# Patient Record
Sex: MALE | Race: WHITE | URBAN - METROPOLITAN AREA
[De-identification: names, ages, dates, MRNs, and addresses within clinical notes are randomized per-mention and may not be internally consistent; named-entity substitution may affect disease eponyms.]

---

## 2021-04-22 ENCOUNTER — HOSPITAL ENCOUNTER (OUTPATIENT)
Dept: NUTRITION | Facility: HOSPITAL | Age: 66
End: 2021-04-23
Attending: INTERNAL MEDICINE | Admitting: INTERNAL MEDICINE

## 2021-04-22 LAB
ABS NEUT (OLG): 5.67 X10(3)/MCL (ref 2.1–9.2)
ALBUMIN SERPL-MCNC: 3.4 GM/DL (ref 3.4–4.8)
ALBUMIN/GLOB SERPL: 0.9 RATIO (ref 1.1–2)
ALP SERPL-CCNC: 102 UNIT/L (ref 40–150)
ALT SERPL-CCNC: 12 UNIT/L (ref 0–55)
AST SERPL-CCNC: 15 UNIT/L (ref 5–34)
BASOPHILS # BLD AUTO: 0 X10(3)/MCL (ref 0–0.2)
BASOPHILS NFR BLD AUTO: 0 %
BILIRUB SERPL-MCNC: 0.5 MG/DL
BILIRUBIN DIRECT+TOT PNL SERPL-MCNC: 0.2 MG/DL (ref 0–0.5)
BILIRUBIN DIRECT+TOT PNL SERPL-MCNC: 0.3 MG/DL (ref 0–0.8)
BUN SERPL-MCNC: 12.3 MG/DL (ref 8.4–25.7)
CALCIUM SERPL-MCNC: 9.3 MG/DL (ref 8.8–10)
CHLORIDE SERPL-SCNC: 108 MMOL/L (ref 98–107)
CK MB SERPL-MCNC: 0.5 NG/ML
CK SERPL-CCNC: 40 U/L (ref 30–200)
CO2 SERPL-SCNC: 22 MMOL/L (ref 23–31)
CREAT SERPL-MCNC: 0.86 MG/DL (ref 0.73–1.18)
EOSINOPHIL # BLD AUTO: 0.3 X10(3)/MCL (ref 0–0.9)
EOSINOPHIL NFR BLD AUTO: 4 %
ERYTHROCYTE [DISTWIDTH] IN BLOOD BY AUTOMATED COUNT: 13 % (ref 11.5–17)
GLOBULIN SER-MCNC: 3.8 GM/DL (ref 2.4–3.5)
GLUCOSE SERPL-MCNC: 120 MG/DL (ref 82–115)
HCT VFR BLD AUTO: 35.8 % (ref 42–52)
HGB BLD-MCNC: 11.6 GM/DL (ref 14–18)
LYMPHOCYTES # BLD AUTO: 0.9 X10(3)/MCL (ref 0.6–4.6)
LYMPHOCYTES NFR BLD AUTO: 12 %
MAGNESIUM SERPL-MCNC: 1.9 MG/DL (ref 1.6–2.6)
MAGNESIUM SERPL-MCNC: 2 MG/DL (ref 1.6–2.6)
MCH RBC QN AUTO: 29.6 PG (ref 27–31)
MCHC RBC AUTO-ENTMCNC: 32.4 GM/DL (ref 33–36)
MCV RBC AUTO: 91.3 FL (ref 80–94)
MONOCYTES # BLD AUTO: 0.4 X10(3)/MCL (ref 0.1–1.3)
MONOCYTES NFR BLD AUTO: 6 %
NEUTROPHILS # BLD AUTO: 5.67 X10(3)/MCL (ref 2.1–9.2)
NEUTROPHILS NFR BLD AUTO: 78 %
PLATELET # BLD AUTO: 273 X10(3)/MCL (ref 130–400)
PMV BLD AUTO: 9.8 FL (ref 9.4–12.4)
POTASSIUM SERPL-SCNC: 3.9 MMOL/L (ref 3.5–5.1)
PROT SERPL-MCNC: 7.2 GM/DL (ref 5.8–7.6)
RBC # BLD AUTO: 3.92 X10(6)/MCL (ref 4.7–6.1)
SARS-COV-2 RNA RESP QL NAA+PROBE: NOT DETECTED
SODIUM SERPL-SCNC: 140 MMOL/L (ref 136–145)
TROP 3HR (OHS): <0.01 NG/ML (ref 0–0.04)
TROP IP BASE (OHS): <0.01 NG/ML (ref 0–0.04)
TROPONIN I SERPL-MCNC: <0.01 NG/ML (ref 0–0.04)
TROPONIN I SERPL-MCNC: <0.01 NG/ML (ref 0–0.04)
TSH SERPL-ACNC: 0.39 UIU/ML (ref 0.35–4.94)
WBC # SPEC AUTO: 7.3 X10(3)/MCL (ref 4.5–11.5)

## 2021-04-23 LAB
ABS NEUT (OLG): 5.26 X10(3)/MCL (ref 2.1–9.2)
ALBUMIN SERPL-MCNC: 3.2 GM/DL (ref 3.4–4.8)
ALBUMIN/GLOB SERPL: 1.2 RATIO (ref 1.1–2)
ALP SERPL-CCNC: 92 UNIT/L (ref 40–150)
ALT SERPL-CCNC: 11 UNIT/L (ref 0–55)
AST SERPL-CCNC: 9 UNIT/L (ref 5–34)
BASOPHILS # BLD AUTO: 0 X10(3)/MCL (ref 0–0.2)
BASOPHILS NFR BLD AUTO: 0 %
BILIRUB SERPL-MCNC: 0.4 MG/DL
BILIRUBIN DIRECT+TOT PNL SERPL-MCNC: 0.2 MG/DL (ref 0–0.5)
BILIRUBIN DIRECT+TOT PNL SERPL-MCNC: 0.2 MG/DL (ref 0–0.8)
BUN SERPL-MCNC: 14.6 MG/DL (ref 8.4–25.7)
CALCIUM SERPL-MCNC: 8.4 MG/DL (ref 8.8–10)
CHLORIDE SERPL-SCNC: 107 MMOL/L (ref 98–107)
CO2 SERPL-SCNC: 25 MMOL/L (ref 23–31)
CREAT SERPL-MCNC: 0.8 MG/DL (ref 0.73–1.18)
EOSINOPHIL # BLD AUTO: 0.5 X10(3)/MCL (ref 0–0.9)
EOSINOPHIL NFR BLD AUTO: 6 %
ERYTHROCYTE [DISTWIDTH] IN BLOOD BY AUTOMATED COUNT: 13.2 % (ref 11.5–17)
GLOBULIN SER-MCNC: 2.7 GM/DL (ref 2.4–3.5)
GLUCOSE SERPL-MCNC: 107 MG/DL (ref 82–115)
HCT VFR BLD AUTO: 34.1 % (ref 42–52)
HGB BLD-MCNC: 10.9 GM/DL (ref 14–18)
LYMPHOCYTES # BLD AUTO: 1.6 X10(3)/MCL (ref 0.6–4.6)
LYMPHOCYTES NFR BLD AUTO: 20 %
MCH RBC QN AUTO: 30.1 PG (ref 27–31)
MCHC RBC AUTO-ENTMCNC: 32 GM/DL (ref 33–36)
MCV RBC AUTO: 94.2 FL (ref 80–94)
MONOCYTES # BLD AUTO: 0.7 X10(3)/MCL (ref 0.1–1.3)
MONOCYTES NFR BLD AUTO: 9 %
NEUTROPHILS # BLD AUTO: 5.26 X10(3)/MCL (ref 2.1–9.2)
NEUTROPHILS NFR BLD AUTO: 65 %
PLATELET # BLD AUTO: 257 X10(3)/MCL (ref 130–400)
PMV BLD AUTO: 9.4 FL (ref 9.4–12.4)
POTASSIUM SERPL-SCNC: 4.3 MMOL/L (ref 3.5–5.1)
PROT SERPL-MCNC: 5.9 GM/DL (ref 5.8–7.6)
RBC # BLD AUTO: 3.62 X10(6)/MCL (ref 4.7–6.1)
SODIUM SERPL-SCNC: 139 MMOL/L (ref 136–145)
TROP 6HR (OHS): <0.01 NG/ML (ref 0–0.45)
TROP IP BASE (OHS): <0.01 NG/ML (ref 0–0.04)
WBC # SPEC AUTO: 8.1 X10(3)/MCL (ref 4.5–11.5)

## 2021-04-26 ENCOUNTER — HOSPITAL ENCOUNTER (OUTPATIENT)
Dept: MEDSURG UNIT | Facility: HOSPITAL | Age: 66
End: 2021-04-29
Attending: INTERNAL MEDICINE | Admitting: INTERNAL MEDICINE

## 2021-04-26 LAB
ABS NEUT (OLG): 8.46 X10(3)/MCL (ref 2.1–9.2)
ALBUMIN SERPL-MCNC: 4 GM/DL (ref 3.4–4.8)
ALBUMIN/GLOB SERPL: 1.1 RATIO (ref 1.1–2)
ALP SERPL-CCNC: 122 UNIT/L (ref 40–150)
ALT SERPL-CCNC: 13 UNIT/L (ref 0–55)
APTT PPP: 35.5 SECOND(S) (ref 23.2–33.7)
AST SERPL-CCNC: 11 UNIT/L (ref 5–34)
BASOPHILS # BLD AUTO: 0 X10(3)/MCL (ref 0–0.2)
BASOPHILS NFR BLD AUTO: 0 %
BILIRUB SERPL-MCNC: 0.7 MG/DL
BILIRUBIN DIRECT+TOT PNL SERPL-MCNC: 0.3 MG/DL (ref 0–0.5)
BILIRUBIN DIRECT+TOT PNL SERPL-MCNC: 0.4 MG/DL (ref 0–0.8)
BNP BLD-MCNC: <15 PG/ML (ref 0–100)
BUN SERPL-MCNC: 9.7 MG/DL (ref 8.4–25.7)
CALCIUM SERPL-MCNC: 9.3 MG/DL (ref 8.8–10)
CHLORIDE SERPL-SCNC: 105 MMOL/L (ref 98–107)
CO2 SERPL-SCNC: 24 MMOL/L (ref 23–31)
CREAT SERPL-MCNC: 0.86 MG/DL (ref 0.73–1.18)
EOSINOPHIL # BLD AUTO: 0.6 X10(3)/MCL (ref 0–0.9)
EOSINOPHIL NFR BLD AUTO: 6 %
ERYTHROCYTE [DISTWIDTH] IN BLOOD BY AUTOMATED COUNT: 13.1 % (ref 11.5–17)
GLOBULIN SER-MCNC: 3.5 GM/DL (ref 2.4–3.5)
GLUCOSE SERPL-MCNC: 110 MG/DL (ref 82–115)
HCT VFR BLD AUTO: 39.2 % (ref 42–52)
HGB BLD-MCNC: 13 GM/DL (ref 14–18)
INR PPP: 1.1 (ref 0–1.3)
LYMPHOCYTES # BLD AUTO: 1.1 X10(3)/MCL (ref 0.6–4.6)
LYMPHOCYTES NFR BLD AUTO: 10 %
MCH RBC QN AUTO: 29.7 PG (ref 27–31)
MCHC RBC AUTO-ENTMCNC: 33.2 GM/DL (ref 33–36)
MCV RBC AUTO: 89.5 FL (ref 80–94)
MONOCYTES # BLD AUTO: 0.8 X10(3)/MCL (ref 0.1–1.3)
MONOCYTES NFR BLD AUTO: 7 %
NEUTROPHILS # BLD AUTO: 8.46 X10(3)/MCL (ref 2.1–9.2)
NEUTROPHILS NFR BLD AUTO: 77 %
PLATELET # BLD AUTO: 344 X10(3)/MCL (ref 130–400)
PMV BLD AUTO: 9.2 FL (ref 9.4–12.4)
POC TROPONIN: 0 NG/ML (ref 0–0.08)
POTASSIUM SERPL-SCNC: 4.2 MMOL/L (ref 3.5–5.1)
PROT SERPL-MCNC: 7.5 GM/DL (ref 5.8–7.6)
PROTHROMBIN TIME: 13.6 SECOND(S) (ref 11.1–13.7)
RBC # BLD AUTO: 4.38 X10(6)/MCL (ref 4.7–6.1)
SODIUM SERPL-SCNC: 140 MMOL/L (ref 136–145)
TROPONIN I SERPL-MCNC: <0.01 NG/ML (ref 0–0.04)
WBC # SPEC AUTO: 11 X10(3)/MCL (ref 4.5–11.5)

## 2021-04-27 LAB
CRP SERPL HS-MCNC: 2.36 MG/DL
ERYTHROCYTE [SEDIMENTATION RATE] IN BLOOD: 49 MM/HR (ref 0–15)
FINAL CULTURE: NORMAL
FINAL CULTURE: NORMAL
LDH SERPL-CCNC: 174 UNIT/L (ref 140–271)
PTH-INTACT SERPL-MCNC: 70.9 PG/ML (ref 8.7–77.1)
SARS-COV-2 AG RESP QL IA.RAPID: NEGATIVE
TSH SERPL-ACNC: 0.79 UIU/ML (ref 0.35–4.94)
URATE SERPL-MCNC: 6 MG/DL (ref 3.5–7.2)

## 2021-04-28 LAB
BUN SERPL-MCNC: 18 MG/DL (ref 8.4–25.7)
CALCIUM SERPL-MCNC: 9.8 MG/DL (ref 8.8–10)
CHLORIDE SERPL-SCNC: 105 MMOL/L (ref 98–107)
CO2 SERPL-SCNC: 20 MMOL/L (ref 23–31)
CREAT SERPL-MCNC: 0.8 MG/DL (ref 0.73–1.18)
CREAT/UREA NIT SERPL: 22
ERYTHROCYTE [DISTWIDTH] IN BLOOD BY AUTOMATED COUNT: 13.2 % (ref 11.5–17)
GLUCOSE SERPL-MCNC: 120 MG/DL (ref 82–115)
HCT VFR BLD AUTO: 39.9 % (ref 42–52)
HGB BLD-MCNC: 12.9 GM/DL (ref 14–18)
MCH RBC QN AUTO: 29.7 PG (ref 27–31)
MCHC RBC AUTO-ENTMCNC: 32.3 GM/DL (ref 33–36)
MCV RBC AUTO: 91.7 FL (ref 80–94)
PLATELET # BLD AUTO: 389 X10(3)/MCL (ref 130–400)
PMV BLD AUTO: 9.9 FL (ref 9.4–12.4)
POTASSIUM SERPL-SCNC: 4.6 MMOL/L (ref 3.5–5.1)
RBC # BLD AUTO: 4.35 X10(6)/MCL (ref 4.7–6.1)
SODIUM SERPL-SCNC: 139 MMOL/L (ref 136–145)
WBC # SPEC AUTO: 14 X10(3)/MCL (ref 4.5–11.5)

## 2021-04-30 LAB — EST CREAT CLEARANCE SER (OHS): 85.32 ML/MIN

## 2022-05-03 NOTE — HISTORICAL [facility] C[facility]ER
This is a historical note converted from Blake. Formatting and pictures may have been removed.  Please reference Blake for original formatting and attached multimedia. Chief Complaint  Pt. C/o chest pain with sob started today.. had halter monitor placed today.. reports after eating tonight pain started..  History of Present Illness  This is a 65-year-old male?medical history of?morbid?obesity,?hypertension,?MIKEY on CPAP,?CKD stage II?presented to ED?with complaint of?shortness of breath?and neck pain. ?Patient has been having ongoing symptoms?for the past 3 months,?neck pain,?intermittent lightheadedness,?and?hoarseness?recently?had?outpatient MRI C-spine and MRI brain?that?did not reveal?an etiology,?was admitted?last week?for near syncopal?event and hypotension, echo and carotid?were normal,?discharged with follow-up with cardiology today and had a Holter monitor?placed?this morning. ?He was also planned to follow-up with ENT?next week.  ?  Presented to the ED?complaining?of shortness,?and chest?heaviness. ?Report episodes are intermittent?and usually with changing position?from sitting to standing,?associated with?diaphoresis. ?On arrival to ED?he was hemodynamically stable and afebrile. ?Chest x-ray showed deviated trachea.  ?  CT soft tissue neck with contrast: Enlargement of bilateral palatine tonsils with near complete effacement of oropharynx this is concerning for tonsillitis, no discrete fluid collection to suggest abscess, mild wall thickening of the hypopharynx may be due to contiguous inflammation. Left lobe of the thyroid markedly enlarged with a large lobulated heterogeneously enhancing predominantly hypodense mass in the left lobe of the thyroid measures 6 x 6 x 8.7 cm.? There is also calcified hypodense component within the mass.? There is associated right lateral shift of the trachea with partial effacement.? There are few enlarged centrally necrotic left level 2 and 3 group lymph nodes largest  measure 17 mm in short axis. this is concerning for thyroid carcinoma. Diffuse wall thickening of esophagus status esophagitis.  ?  ?  ENT consulted and referred to hospitalist medicine service for further evaluation management.  Review of Systems  Except as documented, all other systems reviewed and are negative.  Physical Exam  Vitals & Measurements  T:?37.2? ?C (Oral)? HR:?71(Peripheral)? HR:?70(Monitored)? RR:?18? BP:?116/73? SpO2:?97%? WT:?131.54?kg? BMI:?40.42?  General: appears comfortable  HEENT:?NC/AT, hoarseness/monotone speech, Mallampati?3,?unable to visualize tonsils  Neck:?no JVD,?palpable nontender?anterior neck mass  Chest:?CTABL, no rales or rhonchi, no accessory muscle use  CVS:?regular rhythm. Normal S1/S2.? No pedal edema  Abdomen:?nondistended, normoactive bowel sound, soft and non-tender.  Extremities:?no clubbing or cyanosis  Skin:?warm and dry  Neuro:?AAOx3, no focal neurological deficit  Psych:?cooperative  Assessment/Plan  1. ?Left thyroid mass concerning for carcinoma?with lymph node involvement  2.? Hoarseness likely?recurrent laryngeal nerve involvement  3.? Tracheal deviation  4.? Recurrent?orthostatic hypotension--probably vasovagal episode due to mass-effect on?neck vasculature  5.? Possible esophagitis on?CT  6.? HX HTN, MIKEY/CPAP, HLD  ?  Plan:  ?  -ENT consult  -Dexamethasone 10 mg IV x1  -CT chest?abdomen?pelvis W/WO -metastatic work-up  -Check TSH,?calcitonin  -Resume home?CPAP  -Home medication reviewed and resumed--hold aspirin for possible biopsy, hold antihypertensives  ?  VTE PPX: SCD  Code status: Full  PCP: Brett Cline MD   Problem List/Past Medical History  Ongoing  CKD stage 2, GFR 60-89 ML/MIN  High cholesterol  Hypertension  Morbid obesity  MIKEY (obstructive sleep apnea)  Vitiligo ON HANDS AND ANKLES  Procedure/Surgical History  EGD (08/13/2019)  Esophagogastroduodenoscopy, flexible, transoral; diagnostic, including collection of specimen(s) by brushing or washing,  when performed (separate procedure) (08/13/2019)  COLONOSCOPY (08/20/2018)  Colonoscopy, flexible; diagnostic, including collection of specimen(s) by brushing or washing, when performed (separate procedure) (08/20/2018)  Colonoscopy (09/04/2012)  ARTHROSCOPY LEFT KNEE 1997  RIGHT ROTATOR CUFF REPAIR 2016   Medications  Inpatient  acetaminophen, 650 mg= 20.3 mL, Oral, q6hr, PRN  acetaminophen, 1000 mg= 2 tab(s), Oral, q6hr, PRN  atorvastatin, 20 mg= 2 tab(s), Oral, qPM  latanoprost 0.005% ophthalmic solution, 1 drop(s), OPTH, Once a day (at bedtime)  Protonix, 40 mg= 1 tab(s), Oral, Daily  Zofran, 4 mg= 2 mL, IV Push, q4hr, PRN  Home  acetaminophen 500 mg oral tablet, 1000 mg= 2 tab(s), Oral, q6hr, PRN  amlodipine-benazepril 5 mg-10 mg oral capsule, 1 cap(s), Oral, qPM  aspirin 81 mg oral tablet, 81 mg= 1 tab(s), Oral, qPM  latanoprost 0.005% ophthalmic solution, 1 drop(s), OPTH, Once a day (at bedtime)  Protonix 40 mg ORAL enteric coated tablet, 40 mg= 1 tab(s), Oral, Daily  rosuvastatin 5 mg oral tablet, 5 mg= 1 tab(s), Oral, qPM  Allergies  codeine?(n/a\ )  Social History  Alcohol  Current, 1-2 times per month, Previous treatment: None., 08/16/2018  Substance Use  Never, 08/16/2018  Tobacco  Never (less than 100 in lifetime), No, 04/27/2021  Family History  Lung cancer:?Father,?smoker  Lab Results  General Lab  BUN: 9.7 mg/dL (04/26/21 22:10:00)  Creatinine: 0.86 mg/dL (04/26/21 22:10:00)  Glucose Lvl: 110 mg/dL (04/26/21 22:10:00)  Hct:?39.2 %?Low (04/26/21 22:10:00)  Hgb:?13 gm/dL?Low (04/26/21 22:10:00)  INR: 1.1 (04/26/21 22:10:00)  WBC: 11 x10(3)/mcL (04/26/21 22:10:00)  Platelet: 344 x10(3)/mcL (04/26/21 22:10:00)  Potassium Lvl: 4.2 mmol/L (04/26/21 22:10:00)  Sodium Lvl: 140 mmol/L (04/26/21 22:10:00)      Patient admitted after midnight  He was seen at bedside in ER by me today morning  Hemodynamics are stable  He was on room air at the time of my rounds and he appeared comfortable  Patient seen by ENT  physician, scope attempted, but had some functional issues and therefore ENT will reevaluate again  Patient had a recent hospital admission for orthostatic hypotension-ultrasound carotid and echocardiogram then was unremarkable.  Cause of orthostatics is likely vasovagal effect due to mass-effect on neck vasculature by large thyroid mass  CT abdomen, CT thorax obtained.  Patient has several scattered prominent lymph nodes and few lung nodules.  Otherwise there is no evidence of metastatic disease  Await repeat evaluation by ENT, biopsy and path results  Continue statin  Holding home antihypertensives and aspirin for now  I will continue IV Decadron for 24 hours  Regular labs in the morning

## 2022-05-03 NOTE — HISTORICAL [facility]
This is a historical note converted from [employer]jel. Formatting and pictures may have been removed.  Please reference Ceranjel for original formatting and attached multimedia. Chief Complaint  Pt. C/o chest pain with sob started today.. had halter monitor placed today.. reports after eating tonight pain started..  Reason for Consultation  thyroid mass  History of Present Illness  65 year old male who presented last night with chest and neck pain. He reports a three month history of posterior neck pain and lightheadedness. He was worked up with an MRI of the cervical spine and brain which were unremarkable. About 3 weeks ago he began to have dyspnea lying flat as well as sudden onset of hoarseness.?He was admitted one week ago for hypotension and a near syncopal event, he has a cardiac workup including echo and carotid ultrasound which were normal. Workup on admission included a CT of the neck which showed a large left sided thyroid mass. He was admitted for workup and ENT consulted. He reports some dysphagia and reflux symptoms but reports 25 pound unintentional weight loss in the last 4 months. Dysphonia unchanged. Denies coughing and choking with liquids. Endorses left otalgia. No prior history of thyroid problems. No family history of thyroid cancer.  Review of Systems  Constitutional - Denies  Eye - Denies  HENT - Denies  Respiratory - Denies  Cardiovascular - Denies  Gastrointestinal - Denies  Genitourinary - Denies  Heme/Lymph ?- Denies  Endocrine ?- Denies  Immunologic ?- Denies  Musculoskeletal ?- Denies  Integumentary ?- Denies  Neurologic ?- Denies  All Other ROS negative  Physical Exam  Vitals & Measurements  T:?37.2? ?C (Oral)? HR:?63(Peripheral)? HR:?62(Monitored)? RR:?18? BP:?134/86? SpO2:?95%? WT:?131.54?kg? BMI:?40.42?  General - no acute distress, alert/oriented  Eye - extraocular movements intact bilaterally, pupils equal round and reactive to light and accommodation  Head - normocephalic,  atraumatic  Ears - externally normal  Nose - bilateral nares patent, nasal septal midline, no masses/lesions apparent  Oral cavity/oropharynx - mucosal membranes moist, tonsils within normal limits, floor of mouth soft and nontender, tongue within normal limits, no masses or lesions noted  Neck - firm left sided thyroid mass  Respiratory - nonlabored breathing  Cardiovascular - 2+ carotid pulses  Gastrointestinal - soft, nontender  Musculoskeletal - no peripheral weakness  Integumentary - no obvious skin lesions  Neurologic - cranial nerves II to XII grossly intact bilaterally  ?  FFL  After patient verbal consent, nares were decongested and anesthetized, and flexible fiberoptic laryngoscope passed via right nare with following results and no complications:  Nasal cavity: normal  Nasopharynx: normal  Oropharynx: redundant tissue but otherwise normal  Hypopharynx: pooling of secretions in left pyriform sinus  Glottis: Left true vocal fold fixed in paramedian position. Right TVC mobile. Subglottis patent.  Assessment/Plan  1.?Neoplasm of thyroid?D49.7  Concerning for aggressive thyroid malignancy  Core needle biopsy of left thyroid mass performed this morning - follow up pathology  NPO until MBSS with SLP tomorrow  Will continue to follow closely  ?  Jessica Irving MD   Problem List/Past Medical History  Ongoing  Abdominal pain  CKD stage 2, GFR 60-89 ML/MIN  Hemorrhoid  High cholesterol  Hypertension  Morbid obesity  MIKEY (obstructive sleep apnea)  Snoring  Tricuspid valve regurgitation  Vitiligo ON HANDS AND ANKLES  Historical  No qualifying data  Procedure/Surgical History  EGD (08/13/2019)  Esophagogastroduodenoscopy, flexible, transoral; diagnostic, including collection of specimen(s) by brushing or washing, when performed (separate procedure) (08/13/2019)  COLONOSCOPY (08/20/2018)  Colonoscopy, flexible; diagnostic, including collection of specimen(s) by brushing or washing, when performed (separate procedure)  (08/20/2018)  Colonoscopy (09/04/2012)  ARTHROSCOPY LEFT KNEE 1997  RIGHT ROTATOR CUFF REPAIR 2016   Medications  Inpatient  acetaminophen, 650 mg= 20.3 mL, Oral, q6hr, PRN  acetaminophen, 1000 mg= 2 tab(s), Oral, q6hr, PRN  atorvastatin, 20 mg= 2 tab(s), Oral, qPM  Decadron, 4 mg= 1 mL, IV Push, TID  latanoprost 0.005% ophthalmic solution, 1 drop(s), OPTH, Once a day (at bedtime)  Protonix, 40 mg= 1 tab(s), Oral, Daily  Zofran, 4 mg= 2 mL, IV Push, q4hr, PRN  Home  acetaminophen 500 mg oral tablet, 1000 mg= 2 tab(s), Oral, q6hr, PRN  amlodipine-benazepril 5 mg-10 mg oral capsule, 1 cap(s), Oral, qPM  aspirin 81 mg oral tablet, 81 mg= 1 tab(s), Oral, qPM  latanoprost 0.005% ophthalmic solution, 1 drop(s), OPTH, Once a day (at bedtime)  Protonix 40 mg ORAL enteric coated tablet, 40 mg= 1 tab(s), Oral, Daily  rosuvastatin 5 mg oral tablet, 5 mg= 1 tab(s), Oral, qPM  Allergies  codeine?(n/a\ )  Social History  Abuse/Neglect  No, 04/27/2021  No, No, Yes, 04/22/2021  No, 10/13/2020  No, 03/23/2020  No, 09/05/2019  Alcohol  Current, 1-2 times per month, Previous treatment: None., 08/16/2018  Employment/School  Employed, Highest education level: High school., 02/06/2018  Nutrition/Health  Regular, 08/16/2018  Substance Use  Never, 08/16/2018  Tobacco  Never (less than 100 in lifetime), No, 04/27/2021  Never (less than 100 in lifetime), N/A, 04/22/2021  Never (less than 100 in lifetime), N/A, 10/13/2020  Never (less than 100 in lifetime), No, 03/23/2020  Never (less than 100 in lifetime), N/A, 09/05/2019  Never (less than 100 in lifetime), No, 08/12/2019  Never smoker, 02/06/2018  Family History  Diabetes mellitus type II: Mother and Father.  Hypertension.....: Mother.  Diagnostic Results  (04/26/2021 23:28 CDT CT Soft Tissue Neck W Contrast)  CT Soft Tissue Neck W Contrast  ?  REASON FOR STUDY: Pain, neck  ?  TECHNIQUE: CT imaging was performed of the neck with IV contrast.  Automatic exposure control, adjustment of mA/kV  or iterative  reconstruction technique was used to limit radiation dose.  ?  COMPARISON: No relevant comparison studies available at the time of  dictation.?  ?  FINDINGS:?  ?  Sinuses:The visualized paranasal sinuses are clear.  Nasopharynx:Unremarkable.  Oropharynx:There is enlargement of bilateral palatine tonsils with  near complete effacement of oropharynx. This is concerning for  tonsillitis. A few calcific densities are seen in tonsils. No discrete  peritonsillar fluid collection is identified to suggest abscess. There  is mild wall thickening of the hypopharynx (series 3 image 33). This  may be due to contiguous inflammation.  Trachea:Unremarkable.  Esophagus: Diffuse wall thickening of its visualized portion.  Otherwise unremarkable.  Vascular structures:  Carotids:The carotid arteries are patent.  Jugular Veins:The left jugular vein is not separately identified and  may be encased/ effaced by the mass.  Thyroid glands:The left lobe of the thyroid is markedly enlarged.  There is a large lobulated heterogeneously enhancing predominantly  hypodense mass in the left lobe of the thyroid which measures  approximately 6 x 6 x 8.7 cm (AP x T x CC). There is also a  peripherally calcified hypodense component within this mass. There is  associated right lateral shift of the trachea with partial effacement.  There are a few enlarged centrally necrotic left level II and level  III group of lymph nodes; the largest measures 17 mm in short axis  (series 3 image 34-48). Posteriorly, the mass is inseparable from the  esophagus. It partially encases the left common carotid artery. This  is concerning for thyroid carcinoma.  ?  LUNGS: There is a 9 mm somewhat spiculated nodule in the right lung  apex.  ?  ?  IMPRESSION:  ?  1. There is enlargement of bilateral palatine tonsils with near  complete effacement of oropharynx. This is concerning for tonsillitis.  No discrete peritonsillar fluid collection is identified to  suggest  abscess. There is mild wall thickening of the hypopharynx (series 3  image 33). This may be due to contiguous inflammation.  ?  2. The left lobe of the thyroid is markedly enlarged. There is a large  lobulated heterogeneously enhancing predominantly hypodense mass in  the left lobe of the thyroid which measures approximately 6 x 6 x 8.7  cm (AP x T x CC). There is also a peripherally calcified hypodense  component within this mass. There is associated right lateral shift of  the trachea with partial effacement. There are a few enlarged  centrally necrotic left level II and level III group of lymph nodes;  the largest measures 17 mm in short axis (series 3 image 34-48). This  is concerning for thyroid carcinoma. Correlate clinically as regards  further evaluation and follow-up.  ?  3. Diffuse wall thickening of the esophagus suggesting esophagitis.  ?  4. Nonspecific 9 mm nodule the right lung apex. [1]     [1]?CT Soft Tissue Neck W Contrast; Vincenzo NAIK, Raymon MCCLOUD 04/26/2021 23:28 CDT

## 2022-05-03 NOTE — HISTORICAL [facility]
This is a historical note converted from Ceranjel. Formatting and pictures may have been removed.  Please reference Ceranjel for original formatting and attached multimedia. Admit and Discharge Dates  Admit Date: 04/22/2021  Discharge Date: 04/23/2021  Physicians  Attending Physician - Shad NAIK, Kailey  Admitting Physician - Shad NAIK, Kailey  Consulting Physician - Debbie CHAU, Jose MONTELONGO  Consulting Physician - Del NAIK, Jorge Luis DAVISON  Primary Care Physician - Son NAIK, Brett SMITH  Discharge Diagnosis  Bradycardia?R00.1  Hypotension?I95.9  Near syncope?R55  Shortness of breath?[id number]  Chronic neck pain  Hospital Course  ?  65-year-old white male with past medical history of hypertension, hyperlipidemia patient went to see his primary care physician as he had an episode on Tuesday where?he was?having some generalized weakness and diaphoresis?patient was at?his camp?so he put a wet towel on his face?it got better. ?Patient went to his primary care physician on 4/22?and had a similar episode?of generalized weakness and diaphoresis?at that time his blood pressure was found to be in 70s and his heart rate was in 30s patient was given atropine ?and was sent in to the ER. ?Chest x-ray was done that was negative. ?Lab work showed normal WBC count.? COVID-19 was negative,?he has been admitted to hospitalist service for further management and care.? Patient reports that he gets short winded while he has those episodes. ?He reports did not take his blood pressure medications?but did take?Celebrex,?2 Aleves and Robaxin.??Patient was admitted and seen by cardiology.? His heart rate has been stable overnight. ?No further bradycardic episodes.? Blood pressure has been normal range.??Blood pressure medications were held. ?Carotid Dopplers were done and negative.? He was seen by cardiology and recommended to avoid AV ally blocking agents.? He will follow-up on Monday for?continuous event monitor be placed and  also outpatient Lexiscan.? Of note,?Robaxin?can cause?bradycardia, hypotensive episodes.? I discussed this with patient and to avoid this medication for now.? He expressed understanding. ?Additionally, he is to monitor his blood pressure over the weekend?and resume blood pressure medications if elevated.? Otherwise, he is stable for discharge at this time?with follow-up?with Dr. Cardozo next week as noted.?  ??  Objective  Vitals & Measurements  T:?36.7? ?C (Oral)? TMIN:?36.6? ?C (Oral)? TMAX:?36.8? ?C (Oral)? HR:?59(Peripheral)? RR:?18? BP:?126/78? SpO2:?98%? WT:?136?kg? BMI:?41.98?  Physical Exam  General:?well-developed well-nourished in no acute distress  Respiratory:?clear to auscultation bilaterally  Cardiovascular:?regular rate and rhythm? no edema  Gastrointestinal:?soft, non-tender, non-distended with normal bowel sounds,  Neurologic: cranial nerves intact, no focal deficits  Patient Discharge Condition  stable  Discharge Disposition  Home   Discharge Medication Reconciliation  Prescribed  acetaminophen (acetaminophen 500 mg oral tablet)?1,000 mg, Oral, q6hr, PRN pain, mild  Continue  amlodipine-benazepril (amlodipine-benazepril 5 mg-10 mg oral capsule)?1 cap(s), Oral, qPM  aspirin (aspirin 81 mg oral tablet)?81 mg, Oral, qPM  latanoprost ophthalmic (latanoprost 0.005% ophthalmic solution)?1 drop(s), OPTH, Once a day (at bedtime)  pantoprazole (Protonix 40 mg ORAL enteric coated tablet)?40 mg, Oral, Daily  rosuvastatin (rosuvastatin 5 mg oral tablet)?5 mg, Oral, qPM  Education and Orders Provided  Bradycardia, Adult  Discharge - 04/23/21 11:52:00 CDT, Home, Give all scheduled vaccinations prior to discharge.?  Discharge Activity - Activity as Tolerated?  Discharge Diet - Low Sodium?  Follow up  Son NAIK, Brett SMITH, Attending Physician, Attending Physician  ????Call for followup appt  Loyd Cardozo, on 04/27/2021  ????Tanya/PET- 04/27/21 NOTE: NO CAFFEINE 24 HOURS PRIOR & FAST 6-7 HOURS PRIORFollow Up appt w/   Juliane - 05/12/21 @ 1:00pm  2- week continuous event monitor to be put on 2/26/21 for diagnosis of bradycardia  ???? monitor at [facility] location 4/26/2021 @ 1:15 pm

## 2022-05-03 NOTE — HISTORICAL [facility]
This is a historical note converted from Ceranjel. Formatting and pictures may have been removed.  Please reference Ceranjel for original formatting and attached multimedia. Admit and Discharge Dates  Admit Date: 04/26/2021  Discharge Date: 04/29/2021  Physicians  Attending Physician - Jimmy NAIK, Jayda YEE  Admitting Physician - Jimmy NAIK, Jayda YEE  Consulting Physician - Aristides NAIK, Jessica DE OLIVEIRA  Consulting Physician - Cain NAIK, Bird  Consulting Physician - Cara NAIK, Ti TORO  Primary Care Physician - Son NAIK, Brett SMITH  Discharge Diagnosis  1.?Neoplasm of thyroid?D49.7  Airway compromise?J98.8  Atypical chest pain?R07.89  Chest pain?[id number]  Surgical Procedures  No procedures recorded for this visit.  Immunizations  No immunizations recorded for this visit.  Admission Information  This is a 65-year-old male medical history of morbid obesity, hypertension, MIKEY on CPAP, CKD stage II presented to ED with complaint of shortness of breath and neck pain. ?Patient has been having ongoing symptoms for the past 3 months, neck pain, intermittent lightheadedness, and hoarseness recently had outpatient MRI C-spine and MRI brain that did not reveal an etiology, was admitted last week for near syncopal event and hypotension, echo and carotid were normal, discharged with follow-up with cardiology today and had a Holter monitor placed this morning. ?He was also planned to follow-up with ENT next week.  ?  ?Presented to the ED complaining of shortness, and chest heaviness. ?Report episodes are intermittent and usually with changing position from sitting to standing, associated with diaphoresis. ?On arrival to ED he was hemodynamically stable and afebrile. ?Chest x-ray showed deviated trachea.  ?  CT soft tissue neck with contrast: Enlargement of bilateral palatine tonsils with near complete effacement of oropharynx this is concerning for tonsillitis, no discrete fluid collection to suggest abscess, mild wall  thickening of the hypopharynx may be due to contiguous inflammation. Left lobe of the thyroid markedly enlarged with a large lobulated heterogeneously enhancing predominantly hypodense mass in the left lobe of the thyroid measures 6 x 6 x 8.7 cm. ?There is also calcified hypodense component within the mass. ?There is associated right lateral shift of the trachea with partial effacement. ?There are few enlarged centrally necrotic left level 2 and 3 group lymph nodes largest measure 17 mm in short axis. this is concerning for thyroid carcinoma. Diffuse wall thickening of esophagus status esophagitis.  ?  ?ENT consulted and referred to hospitalist medicine service for further evaluation management. Patient had biopsy of the thyroid mass. He was cleared by [facility] for po diet.?  Hospital Course  Ultrasound biopsy of the thyroid on 427 pathology report came back positive for malignancy favoring high-grade carcinoma.? Post biopsy patient seemed to remain stable, tolerated his diet however he had complaints of persistent headache and neck pain not worsening and controlled with Fioricet and pain medication.? Patient will discharge with appropriate pain/headache medication for adequate control.  ENT physician was consulted to evaluate patient and recommended prompt evaluation at [facility], family was agreeable so he was referred to fast clinic at [facility], they will contact him at some point today and he would likely have an appointment sometime next week.? Patient and family, daughter and wife present at bedside are agreeable with current discharge plan.? Patient will receive Fioricet and Percocet for headache and pain control.? He voiced agreement with plan and no adverse concerns/rejection. patient is advised to follow-up appropriately and call 911 or return to the ED if symptoms worsen or if new symptoms arise.  Significant Findings  Accession:?[id number]  Order:?US Biopsy Thyroid Core Needle  Report Dt/Tm:?04/27/2021  15:07  Report:?  PROCEDURE: Ultrasound guided Biopsy of a left thyroid mass.?  ?  INDICATION: 65 years of age Male requiring left thyroid biopsy for  histologic evaluation.  ?  ATTENDING: Khanh Reza  ?  ANESTHESIA: Lidocaine utilized for local anesthesia.  ?  TECHNIQUE/FINDINGS:  After the risks, benefits and alternatives were discussed, consent was  obtained. ?A time out was performed to verify the patients identity  and procedure.  ?  The left thyroid mass was localized with ultrasound to confirm a  window for biopsy. The skin over the biopsy site was cleaned and  prepped in normal sterile fashion. Subcutaneous tissues were  anesthetized with a lidocaine solution. Under continuous ultrasound  guidance 3 fine-needle aspiration on core biopsy was obtained.  ?  The pathologist was present and confirmed adequacy of the samples.?  The patient tolerated the procedure well. ?  ?  Estimated blood loss: < 10 mL.  ?  Followup ultrasound scan demonstrated no evidence of hematoma.  ?  IMPRESSION:  Ultrasound -guided left thyroid biopsy as described above.  ?  I, Dr. Khanh Reza, was present for the entire procedure.?  ?  ?  Accession:?[id number]  Order:?CT Abdomen and Pelvis W W/O Contrast  Report Dt/Tm:?04/27/2021 13:05  Report:?  CT Abdomen and Pelvis W W/O Contrast, CT Thorax W W/O Contrast  ?  REASON FOR STUDY: Other (please specify)  ?  TECHNIQUE: CT imaging was performed of the chest, abdomen, and pelvis  before and after the administration of IV contrast. Automatic exposure  control, adjustment of mA/kV or iterative reconstruction technique was  used to limit radiation dose.  ?  COMPARISON: No relevant comparison studies available at the time of  dictation.?  ?  FINDINGS:?  ?  CHEST: Partial visualization of the neoplastic mass involving the left  lobe of the thyroid better visualized on the neck CT from the day  before.  ?  Inferior to the mass there are a few prominent nodes in the AP window.  These nodes  demonstrate fatty art and are nonspecific. The largest of  these lymph nodes measures 1.1 cm on short axis (series 4, image #22).  ?  There is coronary atherosclerosis. The heart is otherwise  unremarkable. The thoracic aorta is atherosclerotic but normal  caliber. No pericardial fluid. The pulmonary artery is patent  centrally.  ?  9 mm nodule again seen in the apex of the right lung. Tiny probable  lymph node associated with the major fissure on the right. There is a  3 mm nodule in the left lung (series 3, image #36). The bilateral  lungs are otherwise unremarkable. No other lung nodules or masses. No  pneumothorax or pleural fluid.  ?  ?  ABDOMEN/PELVIS: The liver demonstrates a hypodensity too small to  characterize as well as a probable hemangioma involving its posterior  upper left lobe measuring approximately 1.8 cm. The liver is otherwise  unremarkable. No bile duct dilatation. The portal vein is patent. The  gallbladder is unremarkable.  ?  Spleen, adrenals, pancreas, stomach, small bowel, and appendix are  unremarkable. There is diverticulosis coli. The large bowel is  otherwise unremarkable. There is mild bilateral perinephric  inflammatory change suggesting medical renal disease. The bilateral  kidneys are otherwise unremarkable. No hydronephrosis.  ?  The abdominal aorta is atherosclerotic but tapers normally. No  abdominal/pelvic ascites or lymphadenopathy. The urinary bladder  demonstrates diffuse wall thickening but is not well-distended. The  prostate measures 4.6 cm.  ?  There is a small fat-containing periumbilical hernia. No inguinal  lymphadenopathy. The extraperitoneal soft tissues are otherwise  unremarkable.  ?  BONES: There is degenerative change of the spine. Probable bone island  in the left femoral head measuring 7 mm. The osseous structures are  otherwise unremarkable.  ?  ?  IMPRESSION:  ?  1. Partial visualization of a large mass involving the base of the  left neck better visualized  on the CT soft tissue neck from  04/26/2021.  ?  2. There are several scattered prominent lymph nodes in the AP window  of the upper mediastinum that are nonspecific. They are prominent in  size but demonstrate otherwise normal morphology and remain  nonspecific.?  ?  3. 2 nonspecific lung nodules are seen. These can be followed with  future exams to document stability.  ?  4. No evidence of other metastatic disease in the chest, abdomen, or  pelvis.  ?  5. Chronic findings as above.  ?  Accession:?[id number]  Order:?XR Chest 1 View  Report Dt/Tm:?04/27/2021 09:09  Report:?  ?  CLINICAL: ?Dyspnea.  ?  COMPARISON: April 22, 2021.  ?  FINDINGS: ?Cardiopericardial silhouette is within normal limits. There  is unchanged rightward tracheal deviation. Please see CT soft tissue  neck report April 26, 2021. No acute dense focal or segmental  consolidation, congestion, pleural effusion or pneumothorax. ?  ?  IMPRESSION:  ?  As above.  ?  ?  ?  Accession:?[id number]  Order:?CT Soft Tissue Neck W Contrast  Report Dt/Tm:?04/27/2021 07:45  Report:?  CT Soft Tissue Neck W Contrast  ?  REASON FOR STUDY: Pain, neck  ?  TECHNIQUE: CT imaging was performed of the neck with IV contrast.  Automatic exposure control, adjustment of mA/kV or iterative  reconstruction technique was used to limit radiation dose.  ?  COMPARISON: No relevant comparison studies available at the time of  dictation.?  ?  FINDINGS:?  ?  Sinuses:The visualized paranasal sinuses are clear.  Nasopharynx:Unremarkable.  Oropharynx:There is enlargement of bilateral palatine tonsils with  near complete effacement of oropharynx. This is concerning for  tonsillitis. A few calcific densities are seen in tonsils. No discrete  peritonsillar fluid collection is identified to suggest abscess. There  is mild wall thickening of the hypopharynx (series 3 image 33). This  may be due to contiguous inflammation.  Trachea:Unremarkable.  Esophagus: Diffuse wall thickening of its  visualized portion.  Otherwise unremarkable.  Vascular structures:  Carotids:The carotid arteries are patent.  Jugular Veins:The left jugular vein is not separately identified and  may be encased/ effaced by the mass.  Thyroid glands:The left lobe of the thyroid is markedly enlarged.  There is a large lobulated heterogeneously enhancing predominantly  hypodense mass in the left lobe of the thyroid which measures  approximately 6 x 6 x 8.7 cm (AP x T x CC). There is also a  peripherally calcified hypodense component within this mass. There is  associated right lateral shift of the trachea with partial effacement.  There are a few enlarged centrally necrotic left level II and level  III group of lymph nodes; the largest measures 17 mm in short axis  (series 3 image 34-48). Posteriorly, the mass is inseparable from the  esophagus. It partially encases the left common carotid artery. This  is concerning for thyroid carcinoma.  ?  LUNGS: There is a 9 mm somewhat spiculated nodule in the right lung  apex.  ?  ?  IMPRESSION:  ?  1. There is enlargement of bilateral palatine tonsils with near  complete effacement of oropharynx. This is concerning for tonsillitis.  No discrete peritonsillar fluid collection is identified to suggest  abscess. There is mild wall thickening of the hypopharynx (series 3  image 33). This may be due to contiguous inflammation.  ?  2. The left lobe of the thyroid is markedly enlarged. There is a large  lobulated heterogeneously enhancing predominantly hypodense mass in  the left lobe of the thyroid which measures approximately 6 x 6 x 8.7  cm (AP x T x CC). There is also a peripherally calcified hypodense  component within this mass. There is associated right lateral shift of  the trachea with partial effacement. There are a few enlarged  centrally necrotic left level II and level III group of lymph nodes;  the largest measures 17 mm in short axis (series 3 image 34-48). This  is concerning for  thyroid carcinoma. Correlate clinically as regards  further evaluation and follow-up.  ?  3. Diffuse wall thickening of the esophagus suggesting esophagitis.  ?  4. Nonspecific 9 mm nodule the right lung apex.  ?  ?  Minor discrepancies with the preliminary report.  ?  ?  Labs Last 72 Hours?  ?Chemistry? Hematology/Coagulation?   Sodium Lvl: 139 mmol/L (04/28/21 05:00:00) PT: 13.6 second(s) (04/26/21 22:10:00)   Sodium Lvl: 140 mmol/L (04/26/21 22:10:00) INR: 1.1 (04/26/21 22:10:00)   Potassium Lvl: 4.6 mmol/L (04/28/21 05:00:00) PTT:?35.5 second(s)?High (04/26/21 22:10:00)   Potassium Lvl: 4.2 mmol/L (04/26/21 22:10:00) WBC:?14 x10(3)/mcL?High (04/28/21 05:00:00)   Chloride: 105 mmol/L (04/28/21 05:00:00) WBC: 11 x10(3)/mcL (04/26/21 22:10:00)   Chloride: 105 mmol/L (04/26/21 22:10:00) RBC:?4.35 x10(6)/mcL?Low (04/28/21 05:00:00)   CO2:?20 mmol/L?Low (04/28/21 05:00:00) RBC:?4.38 x10(6)/mcL?Low (04/26/21 22:10:00)   CO2: 24 mmol/L (04/26/21 22:10:00) Hgb:?12.9 gm/dL?Low (04/28/21 05:00:00)   Calcium Lvl: 9.8 mg/dL (04/28/21 05:00:00) Hgb:?13 gm/dL?Low (04/26/21 22:10:00)   Calcium Lvl: 9.3 mg/dL (04/26/21 22:10:00) Hct:?39.9 %?Low (04/28/21 05:00:00)   Glucose Lvl:?120 mg/dL?High (04/28/21 05:00:00) Hct:?39.2 %?Low (04/26/21 22:10:00)   Glucose Lvl: 110 mg/dL (04/26/21 22:10:00) Platelet: 389 x10(3)/mcL (04/28/21 05:00:00)   BUN: 18 mg/dL (04/28/21 05:00:00) Platelet: 344 x10(3)/mcL (04/26/21 22:10:00)   BUN: 9.7 mg/dL (04/26/21 22:10:00) MCV: 91.7 fL (04/28/21 05:00:00)   Creatinine: 0.8 mg/dL (04/28/21 05:00:00) MCV: 89.5 fL (04/26/21 22:10:00)   Creatinine: 0.86 mg/dL (04/26/21 22:10:00) MCH: 29.7 pg (04/28/21 05:00:00)   Est Creat Clearance Ser: 100.72 mL/min (04/28/21 07:20:59) MCH: 29.7 pg (04/26/21 22:10:00)   Est Creat Clearance Ser: 93.69 mL/min (04/27/21 02:16:08) MCHC:?32.3 gm/dL?Low (04/28/21 05:00:00)   BUN/Creat Ratio: 22 (04/28/21 05:00:00) MCHC: 33.2 gm/dL (04/26/21 22:10:00)   eGFR-AA: >60  (04/28/21 05:00:00) RDW: 13.2 % (04/28/21 05:00:00)   eGFR-AA: >60 (04/26/21 22:10:00) RDW: 13.1 % (04/26/21 22:10:00)   eGFR-MARISOL: >60 (04/28/21 05:00:00) MPV: 9.9 fL (04/28/21 05:00:00)   eGFR-MARISOL: >60 (04/26/21 22:10:00) MPV:?9.2 fL?Low (04/26/21 22:10:00)   Bili Total: 0.7 mg/dL (04/26/21 22:10:00) Abs Neut: 8.46 x10(3)/mcL (04/26/21 22:10:00)   Bili Direct: 0.3 mg/dL (04/26/21 22:10:00) Neutro Auto: 77 % (04/26/21 22:10:00)   Bili Indirect: 0.4 mg/dL (04/26/21 22:10:00) Lymph Auto: 10 % (04/26/21 22:10:00)   AST: 11 unit/L (04/26/21 22:10:00) Mono Auto: 7 % (04/26/21 22:10:00)   ALT: 13 unit/L (04/26/21 22:10:00) Eos Auto: 6 % (04/26/21 22:10:00)   Alk Phos: 122 unit/L (04/26/21 22:10:00) Abs Eos: 0.6 x10(3)/mcL (04/26/21 22:10:00)   Total Protein: 7.5 gm/dL (04/26/21 22:10:00) Basophil Auto: 0 % (04/26/21 22:10:00)   Albumin Lvl: 4 gm/dL (04/26/21 22:10:00) Abs Neutro: 8.46 x10(3)/mcL (04/26/21 22:10:00)   Globulin: 3.5 gm/dL (04/26/21 22:10:00) Abs Lymph: 1.1 x10(3)/mcL (04/26/21 22:10:00)   A/G Ratio: 1.1 ratio (04/26/21 22:10:00) Abs Mono: 0.8 x10(3)/mcL (04/26/21 22:10:00)   LDH: 174 unit/L (04/27/21 04:07:00) Abs Baso: 0 x10(3)/mcL (04/26/21 22:10:00)   Uric Acid: 6 mg/dL (04/27/21 04:07:00) Sed Rate:?49 mm/hr?High (04/27/21 04:07:00)   POC BNP iSTAT: <15 (04/26/21 22:30:00) ?   CRP High Sens:?2.36 mg/dL?High (04/27/21 04:07:00) ?   PTH Intact: 70.9 pg/mL (04/27/21 04:07:00) ?   Calcitonin Ser LC: <2.0 (04/27/21 04:07:00) ?   Troponin-I: <0.010 (04/26/21 22:10:00) ?   POC Troponin: 0 ng/mL (04/26/21 22:31:00) ?   TSH: 0.7894 uIU/mL (04/27/21 04:07:00) ?   ?  Time Spent on discharge  I have spent 35mins coordinating discharge of the patient. I have discussed all aspect of patients hospital course with the patient. All of patients questions have been answered. Patient voices understand and agreement with discharge plan as noted in this documentation.?  Objective  Vitals & Measurements  T:?36.3? ?C (Oral)?  TMIN:?36.3? ?C (Oral)? TMAX:?36.5? ?C (Oral)? HR:?56(Peripheral)? HR:?56(Monitored)? RR:?18? BP:?112/67? SpO2:?97%?  Physical Exam  General: Appears comfortable, no acute distress.  Cognition and speech:?Hoarse?but?clear and coherent  Neuro:?Alert and awake appropriate level of awareness  Psych:?Cooperative. Appropriate mood and affect.  ?   HEENT:?enlarged neck circumference with tenderness to touch.  ?   Respiratory:??No accessory muscle use. ?Breath sounds are equal.  Cardiovascular:?Regular?rate. ?No peripheral edema  ?   Integumentary:?Warm, dry, intact.  Musculoskeletal:?Purposeful movement noted. ?No contracture.  Patient Discharge Condition  Stable  Discharge Disposition  Home with family and self care.   Discharge Medication Reconciliation  Prescribed  acetaminophen-oxyCODONE (Percocet 7.5 mg-325 mg oral tablet)?1 tab(s), Oral, q6hr, PRN as needed for pain  acetaminophen/butalbital/caffeine (acetaminophen/butalbital/caffeine 325 mg-50 mg-40 mg oral tablet)?1 tab(s), Oral, q6hr, PRN as needed  Continue  amlodipine-benazepril (amlodipine-benazepril 5 mg-10 mg oral capsule)?1 cap(s), Oral, qPM  aspirin (aspirin 81 mg oral tablet)?81 mg, Oral, qPM  latanoprost ophthalmic (latanoprost 0.005% ophthalmic solution)?1 drop(s), OPTH, Once a day (at bedtime)  pantoprazole (Protonix 40 mg ORAL enteric coated tablet)?40 mg, Oral, Daily  rosuvastatin (rosuvastatin 5 mg oral tablet)?5 mg, Oral, qPM  Discontinue  acetaminophen (acetaminophen 500 mg oral tablet)?1,000 mg, Oral, q6hr, PRN pain, mild  Education and Orders Provided  Thyroid Needle Biopsy, Care After  Nonspecific Chest Pain, Easy-to-Read  SLP Laryngeal Exercises(CUSTOM)  SLP Tongue Base Exercises(CUSTOM)  Discharge - 04/29/21 10:26:00 CDT, Home?  Follow up  Loyd Cardozo, on 05/12/2021  ????[place] ON: 5/11/21 @ 9:302 week monitor. Fitting after discharge day. 4/30/21 @ 1:15  MD Goldstein will contact you with appointment date and time.